# Patient Record
Sex: MALE | Race: OTHER | NOT HISPANIC OR LATINO
[De-identification: names, ages, dates, MRNs, and addresses within clinical notes are randomized per-mention and may not be internally consistent; named-entity substitution may affect disease eponyms.]

---

## 2024-07-22 ENCOUNTER — APPOINTMENT (OUTPATIENT)
Dept: PULMONOLOGY | Facility: CLINIC | Age: 59
End: 2024-07-22
Payer: MEDICARE

## 2024-07-22 VITALS
DIASTOLIC BLOOD PRESSURE: 81 MMHG | WEIGHT: 251 LBS | OXYGEN SATURATION: 97 % | HEART RATE: 66 BPM | TEMPERATURE: 98.2 F | SYSTOLIC BLOOD PRESSURE: 125 MMHG

## 2024-07-22 DIAGNOSIS — G47.33 OBSTRUCTIVE SLEEP APNEA (ADULT) (PEDIATRIC): ICD-10-CM

## 2024-07-22 DIAGNOSIS — G47.19 OTHER HYPERSOMNIA: ICD-10-CM

## 2024-07-22 DIAGNOSIS — R05.9 COUGH, UNSPECIFIED: ICD-10-CM

## 2024-07-22 LAB — HEMOGLOBIN: 15.3

## 2024-07-22 PROCEDURE — ZZZZZ: CPT

## 2024-07-22 PROCEDURE — 94727 GAS DIL/WSHOT DETER LNG VOL: CPT

## 2024-07-22 PROCEDURE — 99204 OFFICE O/P NEW MOD 45 MIN: CPT | Mod: 25

## 2024-07-22 PROCEDURE — 85018 HEMOGLOBIN: CPT | Mod: QW

## 2024-07-22 PROCEDURE — 94729 DIFFUSING CAPACITY: CPT

## 2024-07-22 PROCEDURE — 95012 NITRIC OXIDE EXP GAS DETER: CPT

## 2024-07-22 PROCEDURE — 94010 BREATHING CAPACITY TEST: CPT

## 2024-07-22 NOTE — PROCEDURE
[FreeTextEntry1] : Pulmonary Function Test performed in my office today: Spirometry: Within normal limits; Lung Volume: Within normal limits; Diffusion: Within normal limits. ________  Exhaled Nitric Oxide             Final  No Documents Attached    	Test  	Result  	Flag	Reference	Goal	Last Verified  	Exhaled Nitric Oxide	9	 	 		REQUIRED  Ordered by: PADMINI DAN       Collected/Examined: [id number] 08:58AM       Verification Required       Stage: Final       Performed at: Other       Performed by: MARJORIE ROLDAN       Resulted: [id number] 08:58AM       Last Updated: [id number] 08:59AM       Results Hx:

## 2024-07-22 NOTE — ASSESSMENT
[FreeTextEntry1] : Get lab polysomnogram to reassess overall severity of his obstructive sleep apnea at this point.   Return for sleep follow-up 2 weeks after lab polysomnogram. Continue nocturnal CPAP for history of EVENS in the meantime.

## 2024-07-22 NOTE — HISTORY OF PRESENT ILLNESS
[TextBox_4] : Hank is a pleasant 59-year-old gentleman with history of EVENS on CPAP, diabetes mellitus, environmental allergies, he states that his current CPAP machine is more than 5 years old, and he is now working as well as as it should.  He is interested in getting a new CPAP machine for his underlying obstructive sleep apnea.  He does not have chest pain or shortness of breath. He is a non-smoker.

## 2024-08-14 ENCOUNTER — APPOINTMENT (OUTPATIENT)
Dept: GASTROENTEROLOGY | Facility: CLINIC | Age: 59
End: 2024-08-14

## 2024-09-04 ENCOUNTER — APPOINTMENT (OUTPATIENT)
Dept: GASTROENTEROLOGY | Facility: CLINIC | Age: 59
End: 2024-09-04
Payer: MEDICARE

## 2024-09-04 VITALS
HEART RATE: 69 BPM | SYSTOLIC BLOOD PRESSURE: 124 MMHG | DIASTOLIC BLOOD PRESSURE: 78 MMHG | BODY MASS INDEX: 38.72 KG/M2 | WEIGHT: 255.5 LBS | OXYGEN SATURATION: 97 % | TEMPERATURE: 97.9 F | HEIGHT: 68 IN

## 2024-09-04 DIAGNOSIS — Z86.59 PERSONAL HISTORY OF OTHER MENTAL AND BEHAVIORAL DISORDERS: ICD-10-CM

## 2024-09-04 DIAGNOSIS — Z82.49 FAMILY HISTORY OF ISCHEMIC HEART DISEASE AND OTHER DISEASES OF THE CIRCULATORY SYSTEM: ICD-10-CM

## 2024-09-04 DIAGNOSIS — E11.9 TYPE 2 DIABETES MELLITUS W/OUT COMPLICATIONS: ICD-10-CM

## 2024-09-04 DIAGNOSIS — Z86.010 PERSONAL HISTORY OF COLONIC POLYPS: ICD-10-CM

## 2024-09-04 DIAGNOSIS — Z83.3 FAMILY HISTORY OF DIABETES MELLITUS: ICD-10-CM

## 2024-09-04 DIAGNOSIS — Z86.79 PERSONAL HISTORY OF OTHER DISEASES OF THE CIRCULATORY SYSTEM: ICD-10-CM

## 2024-09-04 DIAGNOSIS — F41.9 ANXIETY DISORDER, UNSPECIFIED: ICD-10-CM

## 2024-09-04 DIAGNOSIS — Z12.11 ENCOUNTER FOR SCREENING FOR MALIGNANT NEOPLASM OF COLON: ICD-10-CM

## 2024-09-04 PROCEDURE — 99204 OFFICE O/P NEW MOD 45 MIN: CPT

## 2024-09-04 RX ORDER — PIOGLITAZONE HYDROCHLORIDE 45 MG/1
45 TABLET ORAL
Refills: 0 | Status: ACTIVE | COMMUNITY

## 2024-09-04 RX ORDER — VITAMIN K2 90 MCG
1000 CAPSULE ORAL
Refills: 0 | Status: ACTIVE | COMMUNITY

## 2024-09-04 RX ORDER — ATORVASTATIN CALCIUM 40 MG/1
40 TABLET, FILM COATED ORAL
Refills: 0 | Status: ACTIVE | COMMUNITY

## 2024-09-04 RX ORDER — SODIUM SULFATE, POTASSIUM SULFATE AND MAGNESIUM SULFATE 1.6; 3.13; 17.5 G/177ML; G/177ML; G/177ML
17.5-3.13-1.6 SOLUTION ORAL
Qty: 2 | Refills: 0 | Status: ACTIVE | COMMUNITY
Start: 2024-09-04 | End: 1900-01-01

## 2024-09-04 RX ORDER — EMPAGLIFLOZIN, METFORMIN HYDROCHLORIDE 25; 1000 MG/1; MG/1
25-1000 TABLET, EXTENDED RELEASE ORAL
Refills: 0 | Status: ACTIVE | COMMUNITY

## 2024-09-04 RX ORDER — INSULIN GLARGINE 300 U/ML
300 INJECTION, SOLUTION SUBCUTANEOUS
Refills: 0 | Status: ACTIVE | COMMUNITY

## 2024-09-04 RX ORDER — LISINOPRIL 10 MG/1
10 TABLET ORAL
Refills: 0 | Status: ACTIVE | COMMUNITY

## 2024-09-04 NOTE — ASSESSMENT
[FreeTextEntry1] : Referred pt for screening colonoscopy. Instructed pt to stop taking Synjardy 3 days prior to screening colonoscopy. Pt expressed understanding and agrees with the plan.  A colonoscopy is an exam of the lower part of the gastrointestinal tract, which is called the colon or large intestine (bowel). Colonoscopy is a safe procedure that provides information that other tests may not be able to give.  Colonoscopy is performed by inserting a device called a colonoscope into the anus and advancing through the entire colon. The procedure generally takes between 20 minutes and one hour.  Other tests that are sometimes used to screen for colon cancer, like virtual colonoscopy (also called CT colonography), were discussed separately.  REASONS FOR COLONOSCOPY:  The most common reasons for colonoscopy are:  1. To screen for colon polyps (growths of tissue in the colon) or colon cancer  2. Rectal bleeding  3. A change in bowel habits, like persistent diarrhea  4. Iron deficiency anemia (a decrease in blood count due to loss of iron)  5. A family history of colon cancer  6. A personal history of colon polyps or colon cancer  7. Chronic, unexplained abdominal or rectal pain  8. An abnormal X-Ray exam, like a barium enema or CT scan.   COLONOSCOPY PREPARATION: Before colonoscopy, your colon must be completely cleaned out so that the doctor can see any abnormal areas. This is vitally important to increase the chances that your doctor will identify abnormalities in your colon. If your colon is not completely cleaned out, the chances your doctor will miss abnormalities increases. Your doctor's office will provide specific instructions about how you should prepare for your colonoscopy. Be sure to read these instructions as soon as you get them so you will know how to take the preparation and whether you need to make any changes to your medications or diet. If you have questions, call the doctor's office in advance.  I spent 45 minutes with the patient as well as reviewing documents prior to and after the office visit. Patient verbalized understanding of all information provided. All questions answered and reviewed.  Robert Brunner, MD

## 2024-09-04 NOTE — HISTORY OF PRESENT ILLNESS
[FreeTextEntry1] : Thank you for consult.  Patient is a 58 y/o male with a PMHx of Anxiety, OCD, hypertension, Type 2 Diabetes Mellitus, EVENS, and Colon Polyps, currently on Synjardy, who presents for screening colonoscopy. Pt is currently asymptomatic and feeling well. As per pt, last colonoscopy was 7 years ago at an external facility, which showed 3 polyps. Pt has never had an EGD.

## 2024-09-09 ENCOUNTER — APPOINTMENT (OUTPATIENT)
Dept: PULMONOLOGY | Facility: CLINIC | Age: 59
End: 2024-09-09
Payer: MEDICARE

## 2024-09-09 VITALS
HEIGHT: 68 IN | DIASTOLIC BLOOD PRESSURE: 82 MMHG | TEMPERATURE: 98 F | SYSTOLIC BLOOD PRESSURE: 124 MMHG | WEIGHT: 253 LBS | BODY MASS INDEX: 38.34 KG/M2 | OXYGEN SATURATION: 98 % | HEART RATE: 65 BPM

## 2024-09-09 DIAGNOSIS — G47.33 OBSTRUCTIVE SLEEP APNEA (ADULT) (PEDIATRIC): ICD-10-CM

## 2024-09-09 DIAGNOSIS — G47.19 OTHER HYPERSOMNIA: ICD-10-CM

## 2024-09-09 PROCEDURE — 99213 OFFICE O/P EST LOW 20 MIN: CPT

## 2024-09-09 NOTE — ADDENDUM
[FreeTextEntry1] : I, Kali Yolanda, acted solely as a scribe for Dr. Margie Braun D.O. on this date 09/09/2024.   All medical record entries made by the Scribe were at my, Dr. Margie Braun D.O., direction and personally dictated by me on 09/09/2024. I have reviewed the chart and agree that the record accurately reflects my personal performance of the history, physical exam, assessment and plan. I have also personally directed, reviewed, and agreed with the chart.

## 2024-09-09 NOTE — HISTORY OF PRESENT ILLNESS
[TextBox_4] : Hank is a pleasant 59-year-old gentleman with history of EVENS on CPAP, diabetes mellitus, environmental allergies, he states that his current CPAP machine is more than 5 years old, and he is not working as well as as it should.  He is interested in getting a new CPAP machine for his underlying obstructive sleep apnea.  He does not have chest pain or shortness of breath. He is a non-smoker. The patient notes he is a side sleeper so his CPAP machine has been leaking and causing him discomfort The patient notes they have stopped using water when using the CPAP machine

## 2024-09-09 NOTE — ASSESSMENT
[FreeTextEntry1] : Lab sleep test shows that the patient suffers from moderate sleep apnea. Provider demonstrated and discussed with the patient the different forms of the sleep apnea devices. The provider also explained the difference between APAP and CPAP, namely that the patient does not need to program the APAP machine per the concern of the patient. Discussed with patient at length regarding the aforementioned sleep study findings and all treatment options in the office today, will start patient on APAP(Auto-titrating positive airway pressure) at 5-20 cm H2O via Medium sized nasal pillow at this point. Provider ordered the new device to the patient's home Provider recommends against surgical interventions and instead recommends continuing non-surigical interventions Patient should follow up with provider 1 month after he begins to use his new APAP machine (medium sized nasal pillow). Patient should bring the device with him so that the provider can make in-office adjustments to the machine as needed.

## 2024-09-09 NOTE — PROCEDURE
[FreeTextEntry1] : Lab polysomnogram showed evidence of moderate obstructive sleep apnea with overall AHI of 20.2 events per hour sleep.

## 2024-09-23 ENCOUNTER — TRANSCRIPTION ENCOUNTER (OUTPATIENT)
Age: 59
End: 2024-09-23

## 2024-09-24 ENCOUNTER — APPOINTMENT (OUTPATIENT)
Dept: GASTROENTEROLOGY | Facility: AMBULATORY MEDICAL SERVICES | Age: 59
End: 2024-09-24
Payer: MEDICARE

## 2024-09-24 PROCEDURE — 45380 COLONOSCOPY AND BIOPSY: CPT | Mod: PT

## 2024-09-27 ENCOUNTER — APPOINTMENT (OUTPATIENT)
Dept: PULMONOLOGY | Facility: CLINIC | Age: 59
End: 2024-09-27
Payer: MEDICARE

## 2024-09-27 VITALS
SYSTOLIC BLOOD PRESSURE: 124 MMHG | HEIGHT: 68 IN | HEART RATE: 64 BPM | WEIGHT: 250 LBS | RESPIRATION RATE: 15 BRPM | OXYGEN SATURATION: 94 % | TEMPERATURE: 98.1 F | BODY MASS INDEX: 37.89 KG/M2 | DIASTOLIC BLOOD PRESSURE: 79 MMHG

## 2024-09-27 DIAGNOSIS — G47.19 OTHER HYPERSOMNIA: ICD-10-CM

## 2024-09-27 DIAGNOSIS — G47.33 OBSTRUCTIVE SLEEP APNEA (ADULT) (PEDIATRIC): ICD-10-CM

## 2024-09-27 PROCEDURE — 94660 CPAP INITIATION&MGMT: CPT

## 2024-09-27 NOTE — PROCEDURE
[FreeTextEntry1] : Pt seen in drs office for a CPAP I&M.  Vitals taken and were stable at this time.    Device S/N [id number] & D/N 393  Patient using CPAP- [employer]Med Airsense 11 with a AirFit N20 mask size medium and it is currently set at minimum pressure 7cmH2O maximim pressure [id number].  Problem: Patient was not comfortable with the initial minimum pressure of 5cmH2O, felt the pressure was to low.    Resolution: Minimum pressure was adjusted to 7cmH2O.  Patient was comfortable at the new minimum pressure setting.   Pt was shown how to use their machine and supplies and they were able to return demonstrate usage. They also were instructed on how to clean all supplies and how to obtain new ones.